# Patient Record
Sex: MALE | Race: BLACK OR AFRICAN AMERICAN | NOT HISPANIC OR LATINO | Employment: FULL TIME | URBAN - METROPOLITAN AREA
[De-identification: names, ages, dates, MRNs, and addresses within clinical notes are randomized per-mention and may not be internally consistent; named-entity substitution may affect disease eponyms.]

---

## 2024-08-03 ENCOUNTER — HOSPITAL ENCOUNTER (EMERGENCY)
Facility: HOSPITAL | Age: 49
Discharge: HOME | End: 2024-08-03
Attending: EMERGENCY MEDICINE

## 2024-08-03 VITALS
OXYGEN SATURATION: 99 % | WEIGHT: 180 LBS | DIASTOLIC BLOOD PRESSURE: 98 MMHG | HEART RATE: 89 BPM | BODY MASS INDEX: 25.2 KG/M2 | RESPIRATION RATE: 18 BRPM | HEIGHT: 71 IN | TEMPERATURE: 101 F | SYSTOLIC BLOOD PRESSURE: 177 MMHG

## 2024-08-03 DIAGNOSIS — U07.1 COVID: Primary | ICD-10-CM

## 2024-08-03 PROCEDURE — 25000003 PHARM REV CODE 250: Performed by: EMERGENCY MEDICINE

## 2024-08-03 PROCEDURE — 99283 EMERGENCY DEPT VISIT LOW MDM: CPT

## 2024-08-03 RX ORDER — ACETAMINOPHEN 500 MG
1000 TABLET ORAL
Status: COMPLETED | OUTPATIENT
Start: 2024-08-03 | End: 2024-08-03

## 2024-08-03 RX ADMIN — ACETAMINOPHEN 1000 MG: 500 TABLET ORAL at 04:08

## 2024-10-04 ENCOUNTER — OFFICE VISIT (OUTPATIENT)
Dept: FAMILY MEDICINE | Facility: CLINIC | Age: 49
End: 2024-10-04
Payer: COMMERCIAL

## 2024-10-04 VITALS
RESPIRATION RATE: 16 BRPM | OXYGEN SATURATION: 98 % | DIASTOLIC BLOOD PRESSURE: 97 MMHG | SYSTOLIC BLOOD PRESSURE: 137 MMHG | WEIGHT: 191.63 LBS | BODY MASS INDEX: 27.43 KG/M2 | HEART RATE: 67 BPM | HEIGHT: 70 IN | TEMPERATURE: 99 F

## 2024-10-04 DIAGNOSIS — Z12.11 SCREENING FOR COLORECTAL CANCER: ICD-10-CM

## 2024-10-04 DIAGNOSIS — Z13.220 SCREENING FOR HYPERLIPIDEMIA: ICD-10-CM

## 2024-10-04 DIAGNOSIS — Z11.4 ENCOUNTER FOR SCREENING FOR HIV: ICD-10-CM

## 2024-10-04 DIAGNOSIS — Z76.89 ENCOUNTER TO ESTABLISH CARE: Primary | ICD-10-CM

## 2024-10-04 DIAGNOSIS — Z12.12 SCREENING FOR COLORECTAL CANCER: ICD-10-CM

## 2024-10-04 DIAGNOSIS — Z11.59 ENCOUNTER FOR HEPATITIS C SCREENING TEST FOR LOW RISK PATIENT: ICD-10-CM

## 2024-10-04 DIAGNOSIS — R01.1 SYSTOLIC MURMUR: ICD-10-CM

## 2024-10-04 DIAGNOSIS — I10 PRIMARY HYPERTENSION: ICD-10-CM

## 2024-10-04 DIAGNOSIS — Z13.1 SCREENING FOR DIABETES MELLITUS: ICD-10-CM

## 2024-10-04 LAB
ALBUMIN SERPL-MCNC: 4 G/DL (ref 3.5–5)
ALBUMIN/GLOB SERPL: 1.3 RATIO (ref 1.1–2)
ALP SERPL-CCNC: 65 UNIT/L (ref 40–150)
ALT SERPL-CCNC: 15 UNIT/L (ref 0–55)
ANION GAP SERPL CALC-SCNC: 7 MEQ/L
AST SERPL-CCNC: 21 UNIT/L (ref 5–34)
BASOPHILS # BLD AUTO: 0.01 X10(3)/MCL
BASOPHILS NFR BLD AUTO: 0.2 %
BILIRUB SERPL-MCNC: 0.7 MG/DL
BUN SERPL-MCNC: 13.7 MG/DL (ref 8.9–20.6)
CALCIUM SERPL-MCNC: 9.6 MG/DL (ref 8.4–10.2)
CHLORIDE SERPL-SCNC: 107 MMOL/L (ref 98–107)
CHOLEST SERPL-MCNC: 183 MG/DL
CHOLEST/HDLC SERPL: 4 {RATIO} (ref 0–5)
CO2 SERPL-SCNC: 28 MMOL/L (ref 22–29)
CREAT SERPL-MCNC: 1.37 MG/DL (ref 0.73–1.18)
CREAT/UREA NIT SERPL: 10
EOSINOPHIL # BLD AUTO: 0.21 X10(3)/MCL (ref 0–0.9)
EOSINOPHIL NFR BLD AUTO: 3.2 %
ERYTHROCYTE [DISTWIDTH] IN BLOOD BY AUTOMATED COUNT: 12.8 % (ref 11.5–17)
EST. AVERAGE GLUCOSE BLD GHB EST-MCNC: 114 MG/DL
GFR SERPLBLD CREATININE-BSD FMLA CKD-EPI: >60 ML/MIN/1.73/M2
GLOBULIN SER-MCNC: 3.1 GM/DL (ref 2.4–3.5)
GLUCOSE SERPL-MCNC: 156 MG/DL (ref 74–100)
HBA1C MFR BLD: 5.6 %
HCT VFR BLD AUTO: 39.9 % (ref 42–52)
HCV AB SERPL QL IA: NONREACTIVE
HDLC SERPL-MCNC: 49 MG/DL (ref 35–60)
HGB BLD-MCNC: 13.3 G/DL (ref 14–18)
HIV 1+2 AB+HIV1 P24 AG SERPL QL IA: NONREACTIVE
IMM GRANULOCYTES # BLD AUTO: 0 X10(3)/MCL (ref 0–0.04)
IMM GRANULOCYTES NFR BLD AUTO: 0 %
LDLC SERPL CALC-MCNC: 100 MG/DL (ref 50–140)
LYMPHOCYTES # BLD AUTO: 2.25 X10(3)/MCL (ref 0.6–4.6)
LYMPHOCYTES NFR BLD AUTO: 34.5 %
MCH RBC QN AUTO: 31.2 PG (ref 27–31)
MCHC RBC AUTO-ENTMCNC: 33.3 G/DL (ref 33–36)
MCV RBC AUTO: 93.7 FL (ref 80–94)
MONOCYTES # BLD AUTO: 0.41 X10(3)/MCL (ref 0.1–1.3)
MONOCYTES NFR BLD AUTO: 6.3 %
NEUTROPHILS # BLD AUTO: 3.64 X10(3)/MCL (ref 2.1–9.2)
NEUTROPHILS NFR BLD AUTO: 55.8 %
PLATELET # BLD AUTO: 275 X10(3)/MCL (ref 130–400)
PMV BLD AUTO: 10.4 FL (ref 7.4–10.4)
POTASSIUM SERPL-SCNC: 3.8 MMOL/L (ref 3.5–5.1)
PROT SERPL-MCNC: 7.1 GM/DL (ref 6.4–8.3)
RBC # BLD AUTO: 4.26 X10(6)/MCL (ref 4.7–6.1)
SODIUM SERPL-SCNC: 142 MMOL/L (ref 136–145)
TRIGL SERPL-MCNC: 171 MG/DL (ref 34–140)
VLDLC SERPL CALC-MCNC: 34 MG/DL
WBC # BLD AUTO: 6.52 X10(3)/MCL (ref 4.5–11.5)

## 2024-10-04 PROCEDURE — 83036 HEMOGLOBIN GLYCOSYLATED A1C: CPT

## 2024-10-04 PROCEDURE — 85025 COMPLETE CBC W/AUTO DIFF WBC: CPT

## 2024-10-04 PROCEDURE — 36415 COLL VENOUS BLD VENIPUNCTURE: CPT

## 2024-10-04 PROCEDURE — 80061 LIPID PANEL: CPT

## 2024-10-04 PROCEDURE — 86803 HEPATITIS C AB TEST: CPT

## 2024-10-04 PROCEDURE — 80053 COMPREHEN METABOLIC PANEL: CPT

## 2024-10-04 PROCEDURE — 87389 HIV-1 AG W/HIV-1&-2 AB AG IA: CPT

## 2024-10-04 RX ORDER — BENAZEPRIL HYDROCHLORIDE AND HYDROCHLOROTHIAZIDE 10; 12.5 MG/1; MG/1
1 TABLET ORAL EVERY MORNING
COMMUNITY
Start: 2024-08-08 | End: 2024-10-04 | Stop reason: SDUPTHER

## 2024-10-04 RX ORDER — BENAZEPRIL HYDROCHLORIDE AND HYDROCHLOROTHIAZIDE 10; 12.5 MG/1; MG/1
1 TABLET ORAL EVERY MORNING
Qty: 90 TABLET | Refills: 3 | Status: SHIPPED | OUTPATIENT
Start: 2024-10-04

## 2024-10-04 NOTE — ASSESSMENT & PLAN NOTE
Patient has a soft systolic murmur loudest at the left upper sternal border.  We will obtain an echocardiogram.  He is asymptomatic.

## 2024-10-04 NOTE — ASSESSMENT & PLAN NOTE
Patient has a history of high blood pressure.  Systolic blood pressure in the 170s at a recent urgent care visit.  At that time he was started on Lotensin.  Blood pressure is above goal in clinic today.  We will restart Lotensin 10-12.5 mg daily.    Hypertension Medications               benazepril-hydrochlorthiazide (LOTENSIN HCT) 10-12.5 mg Tab Take 1 tablet by mouth every morning.          AHA/ACC goal <130/80. JNC8 goal <140/90  Low Sodium Diet (DASH Diet - Less than 2 grams of sodium per day).  Monitor blood pressure daily and log. Report consistent numbers greater than 140/90.  Maintain healthy weight with goal BMI <30. Exercise 30 minutes per day, 5 days per week.  Smoking cessation encouraged to aid in BP reduction.

## 2024-10-04 NOTE — PROGRESS NOTES
[facility]  Clarice Lemon MD    Patient ID: [id number]     Chief Complaint: Establish Care      HPI:     Quan Flores III is a 49 y.o. male here today to establish care.    Patient has no concerns today and feels well.    Medical history:  Hypertension  Patient was seen in an urgent care in August for elevated blood pressures.  At that time he was given a prescription for Lotensin 10-12.5 mg daily.  He ran out about 2 weeks ago.  He has been reporting headaches and occasional blurry vision. No chest pain or shortness of breath.  Blood pressure 137/97 in clinic today.      Surgical history:  None     Family history:  Mother - HTN, arthritis   Father - HTN  Siblings - none  MGM -  MGF -   PGM -  PGF -       Social history:  Tobacco - 1/3 ppd since 15yo   Alcohol - 4 per week  Illicit substances - marijuana, quit 5 month ago.   Marital status - single   Sexual activity - female   Occupation - [employer]  Colon cancer screening:  Ordered Cologuard today  Lung cancer screening:  Not applicable  Prostate cancer screening:  We will discuss if he should  AAA screening:  Not applicable  HIV screening:  Ordered today  Hepatitis-C screening:  Ordered today      Tetanus vaccine:  Declined  Influenza vaccine:  Declined  Pneumonia vaccine:  Not applicable  Shingles vaccine:  Not applicable      Past Medical History:   Diagnosis Date    Hypertension         History reviewed. No pertinent surgical history.     Social History     Tobacco Use    Smoking status: Every Day     Current packs/day: 0.25     Types: Cigarettes    Smokeless tobacco: Never   Substance and Sexual Activity    Alcohol use: Yes     Alcohol/week: 3.0 standard drinks of alcohol     Types: 3 Shots of liquor per week    Drug use: Not Currently    Sexual activity: Yes        Current Outpatient Medications   Medication Instructions    benazepril-hydrochlorthiazide (LOTENSIN HCT) 10-12.5 mg Tab 1 tablet, Oral, Every  "morning       Review of patient's allergies indicates:  No Known Allergies     Patient Care Team:  Clarice Lemon MD as PCP - General (Family Medicine)     Subjective:     Review of Systems    12 point review of systems conducted, negative except as stated in the history of present illness. See HPI for details.    Objective:     Visit Vitals  BP (!) 137/97 (BP Location: Right arm, Patient Position: Sitting)   Pulse 67   Temp 98.7 °F (37.1 °C) (Oral)   Resp 16   Ht 5' 10" (1.778 m)   Wt 86.9 kg (191 lb 9.6 oz)   SpO2 98%   BMI 27.49 kg/m²       Physical Exam  Vitals and nursing note reviewed.   Constitutional:       General: He is not in acute distress.     Appearance: He is not ill-appearing.   HENT:      Head: Normocephalic and atraumatic.      Mouth/Throat:      Mouth: Mucous membranes are moist.      Pharynx: Oropharynx is clear.   Eyes:      General: No scleral icterus.     Extraocular Movements: Extraocular movements intact.      Conjunctiva/sclera: Conjunctivae normal.      Pupils: Pupils are equal, round, and reactive to light.   Cardiovascular:      Rate and Rhythm: Normal rate and regular rhythm.      Heart sounds: Murmur (3/6 systolic, heard throughout, loudest at left upper sternal border.) heard.      No friction rub. No gallop.   Pulmonary:      Effort: Pulmonary effort is normal. No respiratory distress.      Breath sounds: Normal breath sounds. No wheezing, rhonchi or rales.   Musculoskeletal:         General: Normal range of motion.      Cervical back: Normal range of motion and neck supple.      Right lower leg: No edema.      Left lower leg: No edema.   Skin:     General: Skin is warm and dry.   Neurological:      General: No focal deficit present.      Mental Status: He is alert.   Psychiatric:         Mood and Affect: Mood normal.         Labs Reviewed:     Chemistry:  Lab Results   Component Value Date     10/04/2021    K 3.3 (L) 10/04/2021    BUN 11.6 10/04/2021    CREATININE 1.37 (H) " "10/04/2021    GLUCOSE 112 (H) 10/04/2021    CALCIUM 9.9 10/04/2021    ALKPHOS 74 10/04/2021    LABPROT 7.5 10/04/2021    ALBUMIN 4.2 10/04/2021    BILIDIR 0.2 10/04/2021    IBILI 0.20 10/04/2021    AST 15 10/04/2021    ALT 12 10/04/2021        No results found for: "HGBA1C", "MICROALBCREA"     Hematology:  Lab Results   Component Value Date    WBC 7.9 10/04/2021    HGB 14.8 10/04/2021    HCT 43.5 10/04/2021     10/04/2021       Lipid Panel:  No results found for: "CHOL", "HDL", "LDL", "TRIG", "TOTALCHOLEST"     Urine:  Lab Results   Component Value Date    APPEARANCEUA Clear 10/04/2021    PROTEINUA Negative 10/04/2021    LEUKOCYTESUR Negative 10/04/2021    RBCUA None Seen 10/04/2021    WBCUA None Seen 10/04/2021    BACTERIA None Seen 10/04/2021        Assessment:       ICD-10-CM ICD-9-CM   1. Encounter to establish care  Z76.89 V65.8   2. Screening for diabetes mellitus  Z13.1 V77.1   3. Screening for hyperlipidemia  Z13.220 V77.91   4. Primary hypertension  I10 401.9   5. Encounter for screening for HIV  Z11.4 V73.89   6. Encounter for hepatitis C screening test for low risk patient  Z11.59 V73.89   7. Systolic murmur  R01.1 785.2   8. Screening for colorectal cancer  Z12.11 V76.51    Z12.12 V76.41        Plan:     1. Encounter to establish care    2. Screening for diabetes mellitus  -     Hemoglobin A1C  -     Comprehensive Metabolic Panel; Future; Expected date: 11/04/2024    3. Screening for hyperlipidemia  -     Lipid Panel    4. Primary hypertension  Assessment & Plan:  Patient has a history of high blood pressure.  Systolic blood pressure in the 170s at a recent urgent care visit.  At that time he was started on Lotensin.  Blood pressure is above goal in clinic today.  We will restart Lotensin 10-12.5 mg daily.    Hypertension Medications               benazepril-hydrochlorthiazide (LOTENSIN HCT) 10-12.5 mg Tab Take 1 tablet by mouth every morning.          AHA/ACC goal <130/80. JNC8 goal <140/90  Low " Sodium Diet (DASH Diet - Less than 2 grams of sodium per day).  Monitor blood pressure daily and log. Report consistent numbers greater than 140/90.  Maintain healthy weight with goal BMI <30. Exercise 30 minutes per day, 5 days per week.  Smoking cessation encouraged to aid in BP reduction.      Orders:  -     CBC Auto Differential  -     Comprehensive Metabolic Panel  -     Lipid Panel  -     Hemoglobin A1C  -     Comprehensive Metabolic Panel; Future; Expected date: 11/04/2024    5. Encounter for screening for HIV  -     HIV 1/2 Ag/Ab (4th Gen)    6. Encounter for hepatitis C screening test for low risk patient  -     Hepatitis C Antibody    7. Systolic murmur  Assessment & Plan:  Patient has a soft systolic murmur loudest at the left upper sternal border.  We will obtain an echocardiogram.  He is asymptomatic.    Orders:  -     Echo; Future    8. Screening for colorectal cancer  -     Cologuard Screening (Multitarget Stool DNA); Future; Expected date: 10/04/2024    Other orders  -     benazepril-hydrochlorthiazide (LOTENSIN HCT) 10-12.5 mg Tab; Take 1 tablet by mouth every morning.  Dispense: 90 tablet; Refill: 3         Follow up in about 4 weeks (around 11/1/2024) for CMP before visit , BP check. In addition to their scheduled follow up, the patient has also been instructed to follow up on as needed basis.     Future Appointments   Date Time Provider Department Center   11/1/2024  9:00 AM Clarice Lemon MD [facility]        Clarice Lemon MD

## 2024-10-07 DIAGNOSIS — D64.9 NORMOCYTIC ANEMIA: Primary | ICD-10-CM

## 2024-10-25 ENCOUNTER — TELEPHONE (OUTPATIENT)
Dept: FAMILY MEDICINE | Facility: CLINIC | Age: 49
End: 2024-10-25
Payer: COMMERCIAL

## 2024-10-25 ENCOUNTER — HOSPITAL ENCOUNTER (OUTPATIENT)
Dept: CARDIOLOGY | Facility: HOSPITAL | Age: 49
Discharge: HOME | End: 2024-10-25
Payer: COMMERCIAL

## 2024-10-25 DIAGNOSIS — R01.1 SYSTOLIC MURMUR: ICD-10-CM

## 2024-10-25 PROCEDURE — 93306 TTE W/DOPPLER COMPLETE: CPT

## 2024-10-25 NOTE — TELEPHONE ENCOUNTER
1. Are there any outstanding tasks in the patient's chart? (Ex. Labs, MMG)?    2. Do we have any outstanding/Pending referrals?    3. Has the patient been seen in an ER, Urgent Care or been admitted to the hospital since last office visit with our office?    4. Has the patient seen any other health care provider (doctors) since last visit?    5. Has the patient had any blood work or x-rays done since last visit?      NA, VM left to return call to clinic. Vm left to remind about getting labs done b.f f.u.

## 2024-10-26 LAB
CV ECHO LV RWT: 0.42 CM
ECHO LV POSTERIOR WALL: 1.1 CM (ref 0.6–1.1)
FRACTIONAL SHORTENING: 30.2 % (ref 28–44)
INTERVENTRICULAR SEPTUM: 1.1 CM (ref 0.6–1.1)
LEFT ATRIUM SIZE: 3.4 CM
LEFT INTERNAL DIMENSION IN SYSTOLE: 3.7 CM (ref 2.1–4)
LEFT VENTRICULAR INTERNAL DIMENSION IN DIASTOLE: 5.3 CM (ref 3.5–6)
LEFT VENTRICULAR MASS: 227.7 G

## 2024-10-29 ENCOUNTER — TELEPHONE (OUTPATIENT)
Dept: FAMILY MEDICINE | Facility: CLINIC | Age: 49
End: 2024-10-29
Payer: COMMERCIAL

## 2024-11-01 PROBLEM — D64.9 NORMOCYTIC ANEMIA: Status: ACTIVE | Noted: 2024-11-01

## 2024-11-01 PROBLEM — R01.1 SYSTOLIC MURMUR: Status: RESOLVED | Noted: 2024-10-04 | Resolved: 2024-11-01

## 2024-11-01 PROBLEM — N18.2 CKD (CHRONIC KIDNEY DISEASE) STAGE 2, GFR 60-89 ML/MIN: Status: ACTIVE | Noted: 2024-11-01

## 2024-11-05 ENCOUNTER — OFFICE VISIT (OUTPATIENT)
Dept: FAMILY MEDICINE | Facility: CLINIC | Age: 49
End: 2024-11-05
Payer: COMMERCIAL

## 2024-11-05 VITALS
OXYGEN SATURATION: 98 % | RESPIRATION RATE: 16 BRPM | BODY MASS INDEX: 28.15 KG/M2 | HEIGHT: 70 IN | HEART RATE: 64 BPM | DIASTOLIC BLOOD PRESSURE: 92 MMHG | WEIGHT: 196.63 LBS | TEMPERATURE: 98 F | SYSTOLIC BLOOD PRESSURE: 140 MMHG

## 2024-11-05 DIAGNOSIS — D64.9 NORMOCYTIC ANEMIA: ICD-10-CM

## 2024-11-05 DIAGNOSIS — I10 PRIMARY HYPERTENSION: Primary | ICD-10-CM

## 2024-11-05 DIAGNOSIS — N18.2 CKD (CHRONIC KIDNEY DISEASE) STAGE 2, GFR 60-89 ML/MIN: ICD-10-CM

## 2024-11-05 PROCEDURE — 3008F BODY MASS INDEX DOCD: CPT | Mod: CPTII,,,

## 2024-11-05 PROCEDURE — 3077F SYST BP >= 140 MM HG: CPT | Mod: CPTII,,,

## 2024-11-05 PROCEDURE — 1159F MED LIST DOCD IN RCRD: CPT | Mod: CPTII,,,

## 2024-11-05 PROCEDURE — 3044F HG A1C LEVEL LT 7.0%: CPT | Mod: CPTII,,,

## 2024-11-05 PROCEDURE — 99213 OFFICE O/P EST LOW 20 MIN: CPT | Mod: ,,,

## 2024-11-05 PROCEDURE — 3080F DIAST BP >= 90 MM HG: CPT | Mod: CPTII,,,

## 2024-11-05 PROCEDURE — 4010F ACE/ARB THERAPY RXD/TAKEN: CPT | Mod: CPTII,,,

## 2024-11-05 PROCEDURE — 1160F RVW MEDS BY RX/DR IN RCRD: CPT | Mod: CPTII,,,

## 2024-11-05 RX ORDER — BENAZEPRIL HYDROCHLORIDE AND HYDROCHLOROTHIAZIDE 20; 12.5 MG/1; MG/1
1 TABLET ORAL DAILY
Qty: 90 TABLET | Refills: 3 | Status: SHIPPED | OUTPATIENT
Start: 2024-11-05 | End: 2025-11-05

## 2024-11-05 NOTE — ASSESSMENT & PLAN NOTE
Stable CKD    Follow renoprotective measures including Renal Diet (reduce intake of nuts, peanut butter, milk, cheese, dried beans, peas) and Low Sodium Diet (less than 2 grams per day).  Avoid NSAIDs (Aleve, Mobic, Celebrex, Ibuprofen, Advil, Toradol and Diclofenac). May take Tylenol as needed for headache/pain.  Control DM with goal A1C <7. BP goal <130/80. LDL goal < 100.  Stay well hydrated. Avoid alcohol and soda. Limit tea and coffee.  Smoking Cessation recommended.

## 2024-11-05 NOTE — ASSESSMENT & PLAN NOTE
Blood pressure still above goal.  We will increase Lotensin HCT to 20-12.5 mg daily.  AHA/ACC goal <130/80. JNC8 goal <140/90  Low Sodium Diet (DASH Diet - Less than 2 grams of sodium per day).  Monitor blood pressure daily and log. Report consistent numbers greater than 140/90.  Maintain healthy weight with goal BMI <30. Exercise 30 minutes per day, 5 days per week.  Smoking cessation encouraged to aid in BP reduction.

## 2024-11-05 NOTE — PATIENT INSTRUCTIONS
Follow Renal Diet (reduce intake of nuts, peanut butter, milk, cheese, dried beans, peas) and Low Sodium Diet (less than 2 grams per day).  Avoid NSAIDs (Aleve, Mobic, Celebrex, Ibuprofen, Advil, Toradol and Diclofenac). May take Tylenol as needed for headache/pain.  Control DM with goal A1C <7. BP goal <130/80. LDL goal < 100.  Stay well hydrated. Avoid alcohol and soda. Limit tea and coffee.

## 2024-11-05 NOTE — ASSESSMENT & PLAN NOTE
Normal iron studies, B12, and folate.  Patient was referred to GI for colon cancer screening.  We will repeat CBC next set of labs.

## 2024-11-05 NOTE — PROGRESS NOTES
"  [facility]  Clarice Lemon MD    Patient ID: [id number]     Chief Complaint: Follow-up      HPI:     Quan Flores III is a 49 y.o. male with primary hypertension, stable CKD stage 2, and normocytic anemia here today for follow-up.    Patient has been compliant with Lotensin HCT 10-12.5 mg daily.  Blood pressure in the 140s/90s in clinic.  He has not been checking at home.  No headaches, chest pain, shortness of breath.  Echocardiogram on 10/25/24 for systolic murmur was normal.    Patient feels well today with no current complaints.    Past Medical History:   Diagnosis Date    Hypertension         History reviewed. No pertinent surgical history.     Social History     Tobacco Use    Smoking status: Every Day     Current packs/day: 0.25     Types: Cigarettes    Smokeless tobacco: Never   Substance and Sexual Activity    Alcohol use: Yes     Alcohol/week: 3.0 standard drinks of alcohol     Types: 3 Shots of liquor per week    Drug use: Not Currently    Sexual activity: Yes        Current Outpatient Medications   Medication Instructions    benazepril-hydrochlorthiazide (LOTENSIN HCT) 20-12.5 mg per tablet 1 tablet, Oral, Daily       Review of patient's allergies indicates:  No Known Allergies     Patient Care Team:  Clarice Lemon MD as PCP - General (Family Medicine)     Subjective:     Review of Systems    12 point review of systems conducted, negative except as stated in the history of present illness. See HPI for details.    Objective:     Visit Vitals  BP (!) 140/92 (BP Location: Right arm, Patient Position: Sitting)   Pulse 64   Temp 98 °F (36.7 °C) (Oral)   Resp 16   Ht 5' 10" (1.778 m)   Wt 89.2 kg (196 lb 9.6 oz)   SpO2 98%   BMI 28.21 kg/m²       Physical Exam  Vitals and nursing note reviewed.   Constitutional:       General: He is not in acute distress.     Appearance: He is not ill-appearing.   HENT:      Head: Normocephalic and atraumatic.      Mouth/Throat:      Mouth: Mucous membranes are " moist.      Pharynx: Oropharynx is clear.   Eyes:      General: No scleral icterus.     Extraocular Movements: Extraocular movements intact.      Conjunctiva/sclera: Conjunctivae normal.      Pupils: Pupils are equal, round, and reactive to light.   Neck:      Vascular: No carotid bruit.   Cardiovascular:      Rate and Rhythm: Normal rate and regular rhythm.      Heart sounds: Murmur (Soft systolic) heard.      No friction rub. No gallop.   Pulmonary:      Effort: Pulmonary effort is normal. No respiratory distress.      Breath sounds: Normal breath sounds. No wheezing, rhonchi or rales.   Abdominal:      General: Abdomen is flat. Bowel sounds are normal. There is no distension.      Palpations: Abdomen is soft. There is no mass.      Tenderness: There is no abdominal tenderness.   Musculoskeletal:         General: Normal range of motion.      Cervical back: Normal range of motion and neck supple.      Right lower leg: No edema.      Left lower leg: No edema.   Skin:     General: Skin is warm and dry.   Neurological:      General: No focal deficit present.      Mental Status: He is alert.   Psychiatric:         Mood and Affect: Mood normal.         Labs Reviewed:     Chemistry:  Lab Results   Component Value Date     11/02/2024    K 4.5 11/02/2024    BUN 18.5 11/02/2024    CREATININE 1.27 (H) 11/02/2024    EGFRNORACEVR >60 11/02/2024    GLUCOSE 100 11/02/2024    CALCIUM 9.9 11/02/2024    ALKPHOS 63 11/02/2024    LABPROT 7.4 11/02/2024    ALBUMIN 4.2 11/02/2024    BILIDIR 0.2 10/04/2021    IBILI 0.20 10/04/2021    AST 16 11/02/2024    ALT 14 11/02/2024        Lab Results   Component Value Date    HGBA1C 5.6 10/04/2024        Hematology:  Lab Results   Component Value Date    WBC 6.52 10/04/2024    HGB 13.3 (L) 10/04/2024    HCT 39.9 (L) 10/04/2024     10/04/2024       Lipid Panel:  Lab Results   Component Value Date    CHOL 183 10/04/2024    HDL 49 10/04/2024    .00 10/04/2024    TRIG 171 (H)  10/04/2024    TOTALCHOLEST 4 10/04/2024        Urine:  Lab Results   Component Value Date    APPEARANCEUA Clear 10/04/2021    PROTEINUA Negative 10/04/2021    LEUKOCYTESUR Negative 10/04/2021    RBCUA None Seen 10/04/2021    WBCUA None Seen 10/04/2021    BACTERIA None Seen 10/04/2021        Assessment:       ICD-10-CM ICD-9-CM   1. Primary hypertension  I10 401.9   2. CKD (chronic kidney disease) stage 2, GFR 60-89 ml/min  N18.2 585.2   3. Normocytic anemia  D64.9 285.9        Plan:     1. Primary hypertension  Overview:  Benazepril-hydrochlorothiazide 20-12.5 mg daily    Normal echocardiogram on 10/25/2024.    Assessment & Plan:  Blood pressure still above goal.  We will increase Lotensin HCT to 20-12.5 mg daily.  AHA/ACC goal <130/80. JNC8 goal <140/90  Low Sodium Diet (DASH Diet - Less than 2 grams of sodium per day).  Monitor blood pressure daily and log. Report consistent numbers greater than 140/90.  Maintain healthy weight with goal BMI <30. Exercise 30 minutes per day, 5 days per week.  Smoking cessation encouraged to aid in BP reduction.        2. CKD (chronic kidney disease) stage 2, GFR 60-89 ml/min  Overview:  Likely secondary to untreated hypertension.    Assessment & Plan:  Stable CKD    Follow renoprotective measures including Renal Diet (reduce intake of nuts, peanut butter, milk, cheese, dried beans, peas) and Low Sodium Diet (less than 2 grams per day).  Avoid NSAIDs (Aleve, Mobic, Celebrex, Ibuprofen, Advil, Toradol and Diclofenac). May take Tylenol as needed for headache/pain.  Control DM with goal A1C <7. BP goal <130/80. LDL goal < 100.  Stay well hydrated. Avoid alcohol and soda. Limit tea and coffee.  Smoking Cessation recommended.      3. Normocytic anemia  Assessment & Plan:  Normal iron studies, B12, and folate.  Patient was referred to GI for colon cancer screening.  We will repeat CBC next set of labs.      Other orders  -     benazepril-hydrochlorthiazide (LOTENSIN HCT) 20-12.5 mg per  tablet; Take 1 tablet by mouth once daily.  Dispense: 90 tablet; Refill: 3         Follow up in about 4 weeks (around 12/3/2024) for BP check. In addition to their scheduled follow up, the patient has also been instructed to follow up on as needed basis.     Future Appointments   Date Time Provider Department Center   12/3/2024  3:45 PM Clarice Lemon MD [facility]        Clarice Lemon MD

## 2024-11-27 ENCOUNTER — TELEPHONE (OUTPATIENT)
Dept: FAMILY MEDICINE | Facility: CLINIC | Age: 49
End: 2024-11-27
Payer: COMMERCIAL

## 2024-11-27 NOTE — TELEPHONE ENCOUNTER
1. Are there any outstanding tasks in the patient's chart? (Ex. Labs, MMG)?  Labs - Per MD OK to do day of visit d.t holidays and pt cannot go.    2. Do we have any outstanding/Pending referrals?  No\    3. Has the patient been seen in an ER, Urgent Care or been admitted to the hospital since last office visit with our office?  no    4. Has the patient seen any other health care provider (doctors) since last visit?  No    5. Has the patient had any blood work or x-rays done since last visit?   no

## 2024-12-03 ENCOUNTER — OFFICE VISIT (OUTPATIENT)
Dept: FAMILY MEDICINE | Facility: CLINIC | Age: 49
End: 2024-12-03
Payer: COMMERCIAL

## 2024-12-03 VITALS
TEMPERATURE: 99 F | WEIGHT: 198.63 LBS | DIASTOLIC BLOOD PRESSURE: 100 MMHG | BODY MASS INDEX: 28.44 KG/M2 | SYSTOLIC BLOOD PRESSURE: 140 MMHG | OXYGEN SATURATION: 98 % | HEIGHT: 70 IN | RESPIRATION RATE: 15 BRPM | HEART RATE: 71 BPM

## 2024-12-03 DIAGNOSIS — I10 PRIMARY HYPERTENSION: Primary | ICD-10-CM

## 2024-12-03 DIAGNOSIS — N18.2 CKD (CHRONIC KIDNEY DISEASE) STAGE 2, GFR 60-89 ML/MIN: ICD-10-CM

## 2024-12-03 NOTE — ASSESSMENT & PLAN NOTE
Blood pressure elevated in clinic today, but patient missed his medication for a few days.  Continue current regimen for now.  Patient advised to obtain a blood pressure cuff to check at home.  CMP today.    AHA/ACC goal <130/80. JNC8 goal <140/90  Low Sodium Diet (DASH Diet - Less than 2 grams of sodium per day).  Monitor blood pressure daily and log. Report consistent numbers greater than 140/90.  Maintain healthy weight with goal BMI <30. Exercise 30 minutes per day, 5 days per week.  Smoking cessation encouraged to aid in BP reduction.

## 2024-12-03 NOTE — ASSESSMENT & PLAN NOTE
Stable CKD.  CMP today and every 3-6 months.    Follow renoprotective measures including Renal Diet (reduce intake of nuts, peanut butter, milk, cheese, dried beans, peas) and Low Sodium Diet (less than 2 grams per day).  Avoid NSAIDs (Aleve, Mobic, Celebrex, Ibuprofen, Advil, Toradol and Diclofenac). May take Tylenol as needed for headache/pain.  Control DM with goal A1C <7. BP goal <130/80. LDL goal < 100.  Stay well hydrated. Avoid alcohol and soda. Limit tea and coffee.  Smoking Cessation recommended.

## 2024-12-03 NOTE — PROGRESS NOTES
"  [facility]  Clarice Lemon MD    Patient ID: [id number]     Chief Complaint: Follow-up      HPI:     Quan Flores III is a 49 y.o. male here today for hypertension follow-up.    Lotensin-HCTZ was increased to 20-12.5 mg last visit.  Blood pressure 140/98 in clinic today.  He does not check at home because he does not have a cuff.  He missed his medicine for 3 days over the weekend because he was out of town and forgot it.  He had tympanic strain those days.  He currently denies headaches, vision changes, chest pain, shortness of breath.    He reports left knee pain for the last couple of weeks when kneeling.  No pain when walking.  He denied any injury.    Past Medical History:   Diagnosis Date    Hypertension         History reviewed. No pertinent surgical history.     Social History     Tobacco Use    Smoking status: Every Day     Current packs/day: 0.25     Types: Cigarettes    Smokeless tobacco: Never   Substance and Sexual Activity    Alcohol use: Yes     Alcohol/week: 3.0 standard drinks of alcohol     Types: 3 Shots of liquor per week    Drug use: Not Currently    Sexual activity: Yes        Current Outpatient Medications   Medication Instructions    benazepril-hydrochlorthiazide (LOTENSIN HCT) 20-12.5 mg per tablet 1 tablet, Oral, Daily       Review of patient's allergies indicates:  No Known Allergies     Patient Care Team:  Clarice Lemon MD as PCP - General (Family Medicine)     Subjective:     Review of Systems    12 point review of systems conducted, negative except as stated in the history of present illness. See HPI for details.    Objective:     Visit Vitals  BP (!) 140/98   Pulse 71   Temp 98.7 °F (37.1 °C) (Oral)   Resp 15   Ht 5' 10" (1.778 m)   Wt 90.1 kg (198 lb 9.6 oz)   SpO2 98%   BMI 28.50 kg/m²       Physical Exam  Vitals and nursing note reviewed.   Constitutional:       General: He is not in acute distress.     Appearance: He is not ill-appearing.   HENT:      Head: " Normocephalic and atraumatic.      Mouth/Throat:      Mouth: Mucous membranes are moist.      Pharynx: Oropharynx is clear.   Eyes:      General: No scleral icterus.     Extraocular Movements: Extraocular movements intact.      Conjunctiva/sclera: Conjunctivae normal.   Neck:      Vascular: No carotid bruit.   Cardiovascular:      Rate and Rhythm: Normal rate and regular rhythm.      Heart sounds: No murmur heard.     No friction rub. No gallop.   Pulmonary:      Effort: Pulmonary effort is normal. No respiratory distress.      Breath sounds: Normal breath sounds. No wheezing, rhonchi or rales.   Musculoskeletal:         General: Normal range of motion.      Cervical back: Normal range of motion and neck supple.      Right lower leg: No edema.      Left lower leg: No edema.   Skin:     General: Skin is warm and dry.   Neurological:      General: No focal deficit present.      Mental Status: He is alert.   Psychiatric:         Mood and Affect: Mood normal.         Labs Reviewed:     Chemistry:  Lab Results   Component Value Date     11/02/2024    K 4.5 11/02/2024    BUN 18.5 11/02/2024    CREATININE 1.27 (H) 11/02/2024    EGFRNORACEVR >60 11/02/2024    GLUCOSE 100 11/02/2024    CALCIUM 9.9 11/02/2024    ALKPHOS 63 11/02/2024    LABPROT 7.4 11/02/2024    ALBUMIN 4.2 11/02/2024    BILIDIR 0.2 10/04/2021    IBILI 0.20 10/04/2021    AST 16 11/02/2024    ALT 14 11/02/2024        Lab Results   Component Value Date    HGBA1C 5.6 10/04/2024        Hematology:  Lab Results   Component Value Date    WBC 6.52 10/04/2024    HGB 13.3 (L) 10/04/2024    HCT 39.9 (L) 10/04/2024     10/04/2024       Lipid Panel:  Lab Results   Component Value Date    CHOL 183 10/04/2024    HDL 49 10/04/2024    .00 10/04/2024    TRIG 171 (H) 10/04/2024    TOTALCHOLEST 4 10/04/2024        Urine:  Lab Results   Component Value Date    APPEARANCEUA Clear 10/04/2021    PROTEINUA Negative 10/04/2021    LEUKOCYTESUR Negative 10/04/2021     RBCUA None Seen 10/04/2021    WBCUA None Seen 10/04/2021    BACTERIA None Seen 10/04/2021        Assessment:       ICD-10-CM ICD-9-CM   1. Primary hypertension  I10 401.9   2. CKD (chronic kidney disease) stage 2, GFR 60-89 ml/min  N18.2 585.2        Plan:     1. Primary hypertension  Overview:  Benazepril-hydrochlorothiazide 20-12.5 mg daily    Normal echocardiogram on 10/25/2024.    Assessment & Plan:  Blood pressure elevated in clinic today, but patient missed his medication for a few days.  Continue current regimen for now.  Patient advised to obtain a blood pressure cuff to check at home.  CMP today.    AHA/ACC goal <130/80. JNC8 goal <140/90  Low Sodium Diet (DASH Diet - Less than 2 grams of sodium per day).  Monitor blood pressure daily and log. Report consistent numbers greater than 140/90.  Maintain healthy weight with goal BMI <30. Exercise 30 minutes per day, 5 days per week.  Smoking cessation encouraged to aid in BP reduction.      Orders:  -     Comprehensive Metabolic Panel  -     CBC Auto Differential    2. CKD (chronic kidney disease) stage 2, GFR 60-89 ml/min  Overview:  Likely secondary to untreated hypertension.    Assessment & Plan:  Stable CKD.  CMP today and every 3-6 months.    Follow renoprotective measures including Renal Diet (reduce intake of nuts, peanut butter, milk, cheese, dried beans, peas) and Low Sodium Diet (less than 2 grams per day).  Avoid NSAIDs (Aleve, Mobic, Celebrex, Ibuprofen, Advil, Toradol and Diclofenac). May take Tylenol as needed for headache/pain.  Control DM with goal A1C <7. BP goal <130/80. LDL goal < 100.  Stay well hydrated. Avoid alcohol and soda. Limit tea and coffee.  Smoking Cessation recommended.    Orders:  -     Comprehensive Metabolic Panel  -     CBC Auto Differential  -     Comprehensive Metabolic Panel; Future; Expected date: 03/03/2025         Follow up in about 3 months (around 3/3/2025). In addition to their scheduled follow up, the patient has  also been instructed to follow up on as needed basis.     Future Appointments   Date Time Provider Department Center   3/3/2025  3:45 PM Clarice Lemon MD [facility]        Clarice Lemon MD

## 2024-12-03 NOTE — PATIENT INSTRUCTIONS
Kidney Disease Instructions:   Follow a Renal Diet (reduce intake of nuts, peanut butter, milk, cheese, dried beans, peas) and Low Sodium Diet (less than 2 grams per day).  Avoid NSAIDs (Aleve, Mobic, Celebrex, Ibuprofen, Advil, Toradol and Diclofenac). May take Tylenol as needed for headache/pain.  Blood pressure goal <130/80. LDL goal < 100.  Stay well hydrated. Avoid alcohol and soda. Limit tea and coffee.  Smoking Cessation recommended.

## 2025-02-20 ENCOUNTER — TELEPHONE (OUTPATIENT)
Dept: FAMILY MEDICINE | Facility: CLINIC | Age: 50
End: 2025-02-20
Payer: COMMERCIAL

## 2025-02-20 NOTE — TELEPHONE ENCOUNTER
1. Are there any outstanding tasks in the patient's chart? (Ex. Labs, MMG)?  Labs - will draw [facility]  2. Do we have any outstanding/Pending referrals?  No   3. Has the patient been seen in an ER, Urgent Care or been admitted to the hospital since last office visit with our office?  No   4. Has the patient seen any other health care provider (doctors) since last visit?  No   5. Has the patient had any blood work or x-rays done since last visit?  No

## 2025-03-01 ENCOUNTER — LAB VISIT (OUTPATIENT)
Dept: LAB | Facility: HOSPITAL | Age: 50
End: 2025-03-01
Payer: COMMERCIAL

## 2025-03-01 DIAGNOSIS — N18.2 CKD (CHRONIC KIDNEY DISEASE) STAGE 2, GFR 60-89 ML/MIN: ICD-10-CM

## 2025-03-01 LAB
ALBUMIN SERPL-MCNC: 4 G/DL (ref 3.5–5)
ALBUMIN/GLOB SERPL: 1 RATIO (ref 1.1–2)
ALP SERPL-CCNC: 66 UNIT/L (ref 40–150)
ALT SERPL-CCNC: 22 UNIT/L (ref 0–55)
ANION GAP SERPL CALC-SCNC: 8 MEQ/L
AST SERPL-CCNC: 19 UNIT/L (ref 5–34)
BILIRUB SERPL-MCNC: 0.5 MG/DL
BUN SERPL-MCNC: 18.7 MG/DL (ref 8.9–20.6)
CALCIUM SERPL-MCNC: 9.6 MG/DL (ref 8.4–10.2)
CHLORIDE SERPL-SCNC: 108 MMOL/L (ref 98–107)
CO2 SERPL-SCNC: 26 MMOL/L (ref 22–29)
CREAT SERPL-MCNC: 1.18 MG/DL (ref 0.72–1.25)
CREAT/UREA NIT SERPL: 16
GFR SERPLBLD CREATININE-BSD FMLA CKD-EPI: >60 ML/MIN/1.73/M2
GLOBULIN SER-MCNC: 3.9 GM/DL (ref 2.4–3.5)
GLUCOSE SERPL-MCNC: 107 MG/DL (ref 74–100)
POTASSIUM SERPL-SCNC: 3.9 MMOL/L (ref 3.5–5.1)
PROT SERPL-MCNC: 7.9 GM/DL (ref 6.4–8.3)
SODIUM SERPL-SCNC: 142 MMOL/L (ref 136–145)

## 2025-03-01 PROCEDURE — 36415 COLL VENOUS BLD VENIPUNCTURE: CPT

## 2025-03-01 PROCEDURE — 80053 COMPREHEN METABOLIC PANEL: CPT

## 2025-03-03 ENCOUNTER — RESULTS FOLLOW-UP (OUTPATIENT)
Dept: FAMILY MEDICINE | Facility: CLINIC | Age: 50
End: 2025-03-03

## 2025-03-03 ENCOUNTER — OFFICE VISIT (OUTPATIENT)
Dept: FAMILY MEDICINE | Facility: CLINIC | Age: 50
End: 2025-03-03
Payer: COMMERCIAL

## 2025-03-03 VITALS
BODY MASS INDEX: 29.82 KG/M2 | SYSTOLIC BLOOD PRESSURE: 132 MMHG | TEMPERATURE: 98 F | WEIGHT: 207.81 LBS | DIASTOLIC BLOOD PRESSURE: 98 MMHG | RESPIRATION RATE: 15 BRPM | OXYGEN SATURATION: 98 % | HEART RATE: 76 BPM

## 2025-03-03 DIAGNOSIS — I10 PRIMARY HYPERTENSION: Primary | ICD-10-CM

## 2025-03-03 PROCEDURE — 1160F RVW MEDS BY RX/DR IN RCRD: CPT | Mod: CPTII,,,

## 2025-03-03 PROCEDURE — 1159F MED LIST DOCD IN RCRD: CPT | Mod: CPTII,,,

## 2025-03-03 PROCEDURE — 3080F DIAST BP >= 90 MM HG: CPT | Mod: CPTII,,,

## 2025-03-03 PROCEDURE — 3075F SYST BP GE 130 - 139MM HG: CPT | Mod: CPTII,,,

## 2025-03-03 PROCEDURE — 99213 OFFICE O/P EST LOW 20 MIN: CPT | Mod: ,,,

## 2025-03-03 PROCEDURE — 3008F BODY MASS INDEX DOCD: CPT | Mod: CPTII,,,

## 2025-03-03 RX ORDER — BENAZEPRIL HYDROCHLORIDE AND HYDROCHLOROTHIAZIDE 20; 12.5 MG/1; MG/1
1 TABLET ORAL DAILY
Qty: 90 TABLET | Refills: 3 | Status: SHIPPED | OUTPATIENT
Start: 2025-03-03 | End: 2025-06-01

## 2025-03-03 NOTE — ASSESSMENT & PLAN NOTE
Blood pressure well-controlled.  Continue above regimen.    AHA/ACC goal <130/80. JNC8 goal <140/90  Low Sodium Diet (DASH Diet - Less than 2 grams of sodium per day).  Monitor blood pressure daily and log. Report consistent numbers greater than 140/90.  Maintain healthy weight with goal BMI <30. Exercise 30 minutes per day, 5 days per week.  Smoking cessation encouraged to aid in BP reduction.

## 2025-03-03 NOTE — PROGRESS NOTES
[facility]  Clarice Lemon MD    Patient ID: [id number]     Chief Complaint: Follow-up      HPI:     Quan Flores III is a 49 y.o. male here today for hypertension follow-up.    Blood pressure 132/98 in clinic today.  This pain, shortness of breath, peripheral edema, or any other symptoms.  He is compliant with Lotensin HCTZ 20-12.5.    Creatinine improved to normal.    Past Medical History:   Diagnosis Date    Hypertension         History reviewed. No pertinent surgical history.     Social History     Tobacco Use    Smoking status: Every Day     Current packs/day: 0.25     Types: Cigarettes    Smokeless tobacco: Never   Substance and Sexual Activity    Alcohol use: Yes     Alcohol/week: 3.0 standard drinks of alcohol     Types: 3 Shots of liquor per week    Drug use: Not Currently    Sexual activity: Yes        Current Outpatient Medications   Medication Instructions    benazepril-hydrochlorthiazide (LOTENSIN HCT) 20-12.5 mg per tablet 1 tablet, Oral, Daily       Review of patient's allergies indicates:  No Known Allergies     Patient Care Team:  Clarice Lemon MD as PCP - General (Family Medicine)     Subjective:     Review of Systems    12 point review of systems conducted, negative except as stated in the history of present illness. See HPI for details.    Objective:     Visit Vitals  BP (!) 132/98   Pulse 76   Temp 98.2 °F (36.8 °C) (Oral)   Resp 15   Wt 94.3 kg (207 lb 12.8 oz)   SpO2 98%   BMI 29.82 kg/m²       Physical Exam  Vitals and nursing note reviewed.   Constitutional:       General: He is not in acute distress.     Appearance: Normal appearance. He is not ill-appearing or diaphoretic.   HENT:      Head: Normocephalic and atraumatic.      Mouth/Throat:      Mouth: Mucous membranes are moist.      Pharynx: Oropharynx is clear.   Eyes:      Extraocular Movements: Extraocular movements intact.      Conjunctiva/sclera: Conjunctivae normal.   Cardiovascular:      Rate and Rhythm: Normal rate  and regular rhythm.      Pulses: Normal pulses.      Heart sounds: No murmur heard.  Pulmonary:      Effort: Pulmonary effort is normal. No respiratory distress.      Breath sounds: Normal breath sounds. No wheezing, rhonchi or rales.   Musculoskeletal:      Right lower leg: No edema.      Left lower leg: No edema.   Skin:     General: Skin is warm and dry.   Neurological:      General: No focal deficit present.      Mental Status: He is alert and oriented to person, place, and time. Mental status is at baseline.   Psychiatric:         Mood and Affect: Mood normal.         Labs Reviewed:     Chemistry:  Lab Results   Component Value Date     03/01/2025    K 3.9 03/01/2025    BUN 18.7 03/01/2025    CREATININE 1.18 03/01/2025    EGFRNORACEVR >60 03/01/2025    GLUCOSE 107 (H) 03/01/2025    CALCIUM 9.6 03/01/2025    ALKPHOS 66 03/01/2025    LABPROT 7.9 03/01/2025    ALBUMIN 4.0 03/01/2025    BILIDIR 0.2 10/04/2021    IBILI 0.20 10/04/2021    AST 19 03/01/2025    ALT 22 03/01/2025        Lab Results   Component Value Date    HGBA1C 5.6 10/04/2024        Hematology:  Lab Results   Component Value Date    WBC 6.52 10/04/2024    HGB 13.3 (L) 10/04/2024    HCT 39.9 (L) 10/04/2024     10/04/2024       Lipid Panel:  Lab Results   Component Value Date    CHOL 183 10/04/2024    HDL 49 10/04/2024    .00 10/04/2024    TRIG 171 (H) 10/04/2024    TOTALCHOLEST 4 10/04/2024        Urine:  Lab Results   Component Value Date    APPEARANCEUA Clear 10/04/2021    PROTEINUA Negative 10/04/2021    LEUKOCYTESUR Negative 10/04/2021    RBCUA None Seen 10/04/2021    WBCUA None Seen 10/04/2021    BACTERIA None Seen 10/04/2021        Assessment:       ICD-10-CM ICD-9-CM   1. Primary hypertension  I10 401.9        Plan:     1. Primary hypertension  Overview:  Benazepril-hydrochlorothiazide 20-12.5 mg daily    Normal echocardiogram on 10/25/2024.    Assessment & Plan:  Blood pressure well-controlled.  Continue above  regimen.    AHA/ACC goal <130/80. JNC8 goal <140/90  Low Sodium Diet (DASH Diet - Less than 2 grams of sodium per day).  Monitor blood pressure daily and log. Report consistent numbers greater than 140/90.  Maintain healthy weight with goal BMI <30. Exercise 30 minutes per day, 5 days per week.  Smoking cessation encouraged to aid in BP reduction.      Orders:  -     CBC Auto Differential; Future; Expected date: 09/03/2025  -     Comprehensive Metabolic Panel; Future; Expected date: 09/03/2025  -     benazepril-hydrochlorthiazide (LOTENSIN HCT) 20-12.5 mg per tablet; Take 1 tablet by mouth once daily.  Dispense: 90 tablet; Refill: 3         Follow up in about 6 months (around 9/3/2025). In addition to their scheduled follow up, the patient has also been instructed to follow up on as needed basis.     Future Appointments   Date Time Provider Department Center   9/4/2025  3:00 PM Clarice Lemon MD [facility]        Clarice Lemon MD

## 2025-06-02 ENCOUNTER — OFFICE VISIT (OUTPATIENT)
Dept: FAMILY MEDICINE | Facility: CLINIC | Age: 50
End: 2025-06-02
Payer: COMMERCIAL

## 2025-06-02 VITALS
HEART RATE: 58 BPM | RESPIRATION RATE: 15 BRPM | DIASTOLIC BLOOD PRESSURE: 94 MMHG | TEMPERATURE: 98 F | SYSTOLIC BLOOD PRESSURE: 146 MMHG | OXYGEN SATURATION: 98 % | BODY MASS INDEX: 28.93 KG/M2 | WEIGHT: 201.63 LBS

## 2025-06-02 DIAGNOSIS — S39.012A STRAIN OF LUMBAR PARASPINOUS MUSCLE, INITIAL ENCOUNTER: Primary | ICD-10-CM

## 2025-06-02 PROCEDURE — 4010F ACE/ARB THERAPY RXD/TAKEN: CPT | Mod: CPTII,,,

## 2025-06-02 PROCEDURE — 1160F RVW MEDS BY RX/DR IN RCRD: CPT | Mod: CPTII,,,

## 2025-06-02 PROCEDURE — 99213 OFFICE O/P EST LOW 20 MIN: CPT | Mod: ,,,

## 2025-06-02 PROCEDURE — 1159F MED LIST DOCD IN RCRD: CPT | Mod: CPTII,,,

## 2025-06-02 PROCEDURE — 3077F SYST BP >= 140 MM HG: CPT | Mod: CPTII,,,

## 2025-06-02 PROCEDURE — 3008F BODY MASS INDEX DOCD: CPT | Mod: CPTII,,,

## 2025-06-02 PROCEDURE — 3080F DIAST BP >= 90 MM HG: CPT | Mod: CPTII,,,

## 2025-06-02 RX ORDER — METHOCARBAMOL 750 MG/1
750 TABLET, FILM COATED ORAL 3 TIMES DAILY
Qty: 30 TABLET | Refills: 0 | Status: SHIPPED | OUTPATIENT
Start: 2025-06-02 | End: 2025-06-12

## 2025-06-02 RX ORDER — MELOXICAM 7.5 MG/1
7.5 TABLET ORAL DAILY
Qty: 30 TABLET | Refills: 0 | Status: SHIPPED | OUTPATIENT
Start: 2025-06-02

## 2025-06-02 RX ORDER — HYDROCODONE BITARTRATE AND ACETAMINOPHEN 7.5; 325 MG/1; MG/1
1 TABLET ORAL
COMMUNITY
Start: 2025-05-01

## 2025-06-13 ENCOUNTER — OFFICE VISIT (OUTPATIENT)
Dept: FAMILY MEDICINE | Facility: CLINIC | Age: 50
End: 2025-06-13
Payer: COMMERCIAL

## 2025-06-13 VITALS
DIASTOLIC BLOOD PRESSURE: 101 MMHG | BODY MASS INDEX: 28.73 KG/M2 | OXYGEN SATURATION: 98 % | RESPIRATION RATE: 15 BRPM | WEIGHT: 200.19 LBS | HEART RATE: 58 BPM | SYSTOLIC BLOOD PRESSURE: 153 MMHG | TEMPERATURE: 98 F

## 2025-06-13 DIAGNOSIS — S39.012A STRAIN OF LUMBAR PARASPINOUS MUSCLE, INITIAL ENCOUNTER: ICD-10-CM

## 2025-06-13 RX ORDER — METHOCARBAMOL 750 MG/1
750 TABLET, FILM COATED ORAL 3 TIMES DAILY
Qty: 30 TABLET | Refills: 0 | Status: SHIPPED | OUTPATIENT
Start: 2025-06-13 | End: 2025-06-23

## 2025-06-13 NOTE — PROGRESS NOTES
[facility]  Clarice Lemon MD    Patient ID: [id number]     Chief Complaint: Back Pain      HPI:     Quan Flores III is a 50 y.o. male here today for back pain.    Patient was seen last week for low back pain after lifting something heavy.  This pain has mostly resolved.    He lifted something heavy again Wednesdays and had lower back pain radiating around into his groin.  It only hurts with certain movements.  Meloxicam and ox are helping.    Past Medical History:   Diagnosis Date    Hypertension         History reviewed. No pertinent surgical history.     Social History     Tobacco Use    Smoking status: Every Day     Current packs/day: 0.25     Types: Cigarettes    Smokeless tobacco: Never   Substance and Sexual Activity    Alcohol use: Yes     Alcohol/week: 3.0 standard drinks of alcohol     Types: 3 Shots of liquor per week    Drug use: Not Currently    Sexual activity: Yes        Current Outpatient Medications   Medication Instructions    benazepril-hydrochlorthiazide (LOTENSIN HCT) 20-12.5 mg per tablet 1 tablet, Oral, Daily    meloxicam (MOBIC) 7.5 mg, Oral, Daily    methocarbamoL (ROBAXIN) 750 mg, Oral, 3 times daily       Review of patient's allergies indicates:  No Known Allergies     Patient Care Team:  Clarice Lemon MD as PCP - General (Family Medicine)     Subjective:     Review of Systems    12 point review of systems conducted, negative except as stated in the history of present illness. See HPI for details.    Objective:     Visit Vitals  BP (!) 153/101   Pulse (!) 58   Temp 97.7 °F (36.5 °C) (Oral)   Resp 15   Wt 90.8 kg (200 lb 3.2 oz)   SpO2 98%   BMI 28.73 kg/m²       Physical Exam  Vitals and nursing note reviewed.   Constitutional:       General: He is not in acute distress.     Appearance: Normal appearance. He is not ill-appearing or diaphoretic.   HENT:      Head: Normocephalic and atraumatic.      Mouth/Throat:      Mouth: Mucous membranes are moist.      Pharynx: Oropharynx  is clear.   Eyes:      Extraocular Movements: Extraocular movements intact.      Conjunctiva/sclera: Conjunctivae normal.   Cardiovascular:      Rate and Rhythm: Normal rate.   Pulmonary:      Effort: Pulmonary effort is normal. No respiratory distress.   Musculoskeletal:      Right lower leg: No edema.      Left lower leg: No edema.      Comments: Lumbar paraspinal muscle tenderness.   Skin:     General: Skin is warm and dry.   Neurological:      General: No focal deficit present.      Mental Status: He is alert and oriented to person, place, and time. Mental status is at baseline.   Psychiatric:         Mood and Affect: Mood normal.         Labs Reviewed:     Chemistry:  Lab Results   Component Value Date     03/01/2025    K 3.9 03/01/2025    BUN 18.7 03/01/2025    CREATININE 1.18 03/01/2025    EGFRNORACEVR >60 03/01/2025    CALCIUM 9.6 03/01/2025    ALKPHOS 66 03/01/2025    ALBUMIN 4.0 03/01/2025    BILIDIR 0.2 10/04/2021    IBILI 0.20 10/04/2021    AST 19 03/01/2025    ALT 22 03/01/2025        Lab Results   Component Value Date    HGBA1C 5.6 10/04/2024        Hematology:  Lab Results   Component Value Date    WBC 6.52 10/04/2024    HGB 13.3 (L) 10/04/2024    HCT 39.9 (L) 10/04/2024     10/04/2024       Lipid Panel:  Lab Results   Component Value Date    CHOL 183 10/04/2024    HDL 49 10/04/2024    .00 10/04/2024    TRIG 171 (H) 10/04/2024    TOTALCHOLEST 4 10/04/2024        Urine:  Lab Results   Component Value Date    APPEARANCEUA Clear 10/04/2021    PROTEINUA Negative 10/04/2021    LEUKOCYTESUR Negative 10/04/2021    RBCUA None Seen 10/04/2021    WBCUA None Seen 10/04/2021    BACTERIA None Seen 10/04/2021        Assessment:       ICD-10-CM ICD-9-CM   1. Strain of lumbar paraspinous muscle, initial encounter  S39.012A 847.2        Plan:     1. Strain of lumbar paraspinous muscle, initial encounter  Assessment & Plan:  Patient with new lumbar muscle strain after lifting something heavy at  work.  Robaxin and meloxicam have been working well.  We will refill the Robaxin.  He still has a lot of meloxicam at home.  Patient was advised to not lift anything heavy for at least 2 weeks at work.  Work note provided.  Consider physical therapy and imaging if does not improve.    Orders:  -     methocarbamoL (ROBAXIN) 750 MG Tab; Take 1 tablet (750 mg total) by mouth 3 (three) times daily. for 10 days  Dispense: 30 tablet; Refill: 0         No follow-ups on file. In addition to their scheduled follow up, the patient has also been instructed to follow up on as needed basis.     Future Appointments   Date Time Provider Department Center   9/4/2025  3:00 PM Clarice Lemon MD [facility]        Clarice Lemon MD

## 2025-06-13 NOTE — ASSESSMENT & PLAN NOTE
Patient with new lumbar muscle strain after lifting something heavy at work.  Robaxin and meloxicam have been working well.  We will refill the Robaxin.  He still has a lot of meloxicam at home.  Patient was advised to not lift anything heavy for at least 2 weeks at work.  Work note provided.  Consider physical therapy and imaging if does not improve.

## 2025-06-13 NOTE — LETTER
June 13, 2025      [facility]  [address] [place] [address]  Phone: [phone]       Patient: Quan Flores   YOB: 1975  Date of Visit: 06/13/2025    To Whom It May Concern:    Freddie Flores  was at [facility] on 06/13/2025. The patient may return to work with restrictions. He should avoid heavy lifting (greater than 15lbs) for 2 weeks.  If you have any questions or concerns, or if I can be of further assistance, please do not hesitate to contact me.    Sincerely,    Clarice Lemon MD

## 2025-08-28 ENCOUNTER — TELEPHONE (OUTPATIENT)
Dept: FAMILY MEDICINE | Facility: CLINIC | Age: 50
End: 2025-08-28
Payer: COMMERCIAL